# Patient Record
Sex: MALE
[De-identification: names, ages, dates, MRNs, and addresses within clinical notes are randomized per-mention and may not be internally consistent; named-entity substitution may affect disease eponyms.]

---

## 2017-03-14 ENCOUNTER — CHARTING TRANS (OUTPATIENT)
Dept: OTHER | Age: 52
End: 2017-03-14

## 2018-11-05 VITALS
RESPIRATION RATE: 14 BRPM | SYSTOLIC BLOOD PRESSURE: 122 MMHG | HEIGHT: 71 IN | DIASTOLIC BLOOD PRESSURE: 82 MMHG | HEART RATE: 76 BPM | TEMPERATURE: 98.5 F | WEIGHT: 200 LBS | BODY MASS INDEX: 28 KG/M2

## 2020-09-23 ENCOUNTER — OFFICE VISIT (OUTPATIENT)
Dept: NEUROLOGY | Facility: CLINIC | Age: 55
End: 2020-09-23
Payer: COMMERCIAL

## 2020-09-23 ENCOUNTER — HOSPITAL ENCOUNTER (OUTPATIENT)
Dept: GENERAL RADIOLOGY | Age: 55
Discharge: HOME | End: 2020-09-23
Attending: PHYSICAL MEDICINE & REHABILITATION
Payer: COMMERCIAL

## 2020-09-23 VITALS
HEIGHT: 71 IN | BODY MASS INDEX: 27.3 KG/M2 | DIASTOLIC BLOOD PRESSURE: 70 MMHG | SYSTOLIC BLOOD PRESSURE: 120 MMHG | WEIGHT: 195 LBS

## 2020-09-23 DIAGNOSIS — M43.10 RETROLISTHESIS: ICD-10-CM

## 2020-09-23 DIAGNOSIS — M43.10 PARS DEFECT WITH SPONDYLOLISTHESIS: ICD-10-CM

## 2020-09-23 DIAGNOSIS — M43.00 PARS DEFECT WITH SPONDYLOLISTHESIS: ICD-10-CM

## 2020-09-23 DIAGNOSIS — M43.16 SPONDYLOLISTHESIS OF LUMBAR REGION: ICD-10-CM

## 2020-09-23 DIAGNOSIS — M54.16 LUMBAR RADICULOPATHY: ICD-10-CM

## 2020-09-23 DIAGNOSIS — M54.16 LUMBAR RADICULOPATHY: Primary | ICD-10-CM

## 2020-09-23 PROCEDURE — 72110 X-RAY EXAM L-2 SPINE 4/>VWS: CPT | Performed by: PHYSICAL MEDICINE & REHABILITATION

## 2020-09-23 PROCEDURE — 3074F SYST BP LT 130 MM HG: CPT | Performed by: PHYSICAL MEDICINE & REHABILITATION

## 2020-09-23 PROCEDURE — 3008F BODY MASS INDEX DOCD: CPT | Performed by: PHYSICAL MEDICINE & REHABILITATION

## 2020-09-23 PROCEDURE — 99205 OFFICE O/P NEW HI 60 MIN: CPT | Performed by: PHYSICAL MEDICINE & REHABILITATION

## 2020-09-23 PROCEDURE — 3078F DIAST BP <80 MM HG: CPT | Performed by: PHYSICAL MEDICINE & REHABILITATION

## 2020-09-23 NOTE — PROGRESS NOTES
Low Back Pain H & P    Chief Complaint:  Patient presents with:  Establish Care: Pt presents with low back pain which started 3 weeks ago and not due to an injury. Pain radiates to right buttocks and right thigh. Pain is constant.  Bending over increases p sharp pain. · The pain is worse both sitting and standing, going from sitting to standing and becoming erect after bending over. · The pain is better laying on the floor with his legs up and doing posterior pelvic tilts with his stretches.   · There is Relationship status: Not on file      Intimate partner violence        Fear of current or ex partner: Not on file        Emotionally abused: Not on file        Physically abused: Not on file        Forced sexual activity: Not on file    Other Topics      C distress. Patient does not have a cough. HEENT:  Extraocular muscles are intact. There is no kern icterus. Pupils are equal, round, and reactive to light. No redness or discharge bilaterally. Skin:  There are no rashes or lesions.     Vitals:   09/23/ Lumbar Spine:  Sitting straight leg raise-RIGHT Negative for pain   Sitting straight leg raise-LEFT Negative for pain   Supine straight leg raise-RIGHT Negative for pain   Supine straight leg raise-LEFT Negative for pain   Slump test-RIGHT Negative for leeroy

## 2020-09-23 NOTE — PATIENT INSTRUCTIONS
Plan  He will get into the PT for the lumbar spine. The patient does not need any injections at this time. The patient does not need any pain medications at this time. He will follow up in 2-3 months or sooner if needed.

## 2020-10-07 ENCOUNTER — TELEPHONE (OUTPATIENT)
Dept: NEUROLOGY | Facility: CLINIC | Age: 55
End: 2020-10-07

## 2020-10-07 NOTE — TELEPHONE ENCOUNTER
Received voicemail from patient inquiring about plan of care. Received fax from DPT and placed in Dr. Constantin Michaels.     Left message on patient's voicemail regarding the above

## 2020-10-09 NOTE — TELEPHONE ENCOUNTER
Patient notified of Dr. Irasema Peck response and was unclear why we were calling as he did not call office. Patient is already in Physical Therapy, aware of the plan and has a follow up 11/16/20  Apologies offered to patient for the confusion.

## 2020-10-09 NOTE — TELEPHONE ENCOUNTER
He is to start the PT as discussed at his office visit and I have signed the PT note and it will be faxed back.

## 2020-10-12 ENCOUNTER — MED REC SCAN ONLY (OUTPATIENT)
Dept: NEUROLOGY | Facility: CLINIC | Age: 55
End: 2020-10-12

## 2020-11-16 ENCOUNTER — OFFICE VISIT (OUTPATIENT)
Dept: NEUROLOGY | Facility: CLINIC | Age: 55
End: 2020-11-16
Payer: COMMERCIAL

## 2020-11-16 VITALS — HEIGHT: 71 IN | BODY MASS INDEX: 27.3 KG/M2 | WEIGHT: 195 LBS

## 2020-11-16 DIAGNOSIS — M43.16 SPONDYLOLISTHESIS OF LUMBAR REGION: Primary | ICD-10-CM

## 2020-11-16 DIAGNOSIS — M54.16 LUMBAR RADICULOPATHY: ICD-10-CM

## 2020-11-16 DIAGNOSIS — M43.10 RETROLISTHESIS: ICD-10-CM

## 2020-11-16 DIAGNOSIS — M43.10 PARS DEFECT WITH SPONDYLOLISTHESIS: ICD-10-CM

## 2020-11-16 DIAGNOSIS — M43.00 PARS DEFECT WITH SPONDYLOLISTHESIS: ICD-10-CM

## 2020-11-16 PROCEDURE — 3008F BODY MASS INDEX DOCD: CPT | Performed by: PHYSICAL MEDICINE & REHABILITATION

## 2020-11-16 PROCEDURE — 99214 OFFICE O/P EST MOD 30 MIN: CPT | Performed by: PHYSICAL MEDICINE & REHABILITATION

## 2020-11-16 NOTE — PROGRESS NOTES
Low Back Pain H & P    Chief Complaint: Patient presents with:  Low Back Pain: Patient present for f/u on low back that is decreased to soreness in the am. picking up, bad posture increase the pain through out the day.  Rate pain 2/10    Nursing note review resource strain: Not on file      Food insecurity        Worry: Not on file        Inability: Not on file      Transportation needs        Medical: Not on file        Non-medical: Not on file    Tobacco Use      Smoking status: Never Smoker      Smokeless no distress. The patient is well groomed. Psychiatric:  The patient is alert and oriented x 3. The patient has a normal affect and mood. Respiratory:  No acute respiratory distress. Patient does not have a cough.     HEENT:  Extraocular muscles ar any injections at this time. The patient does not need any pain medications at this time. He will follow up as needed. He will sere how he does when he tries to resume golfing in the Spring.     The patient understands and agrees with the stated pl

## 2020-11-16 NOTE — PATIENT INSTRUCTIONS
Plan  The patient will continue with his home exercise program and working out with his . We discussed the need to continue with the core stabilization since this is the best treatment for him to stabilize his spine.     He does not need to have

## 2020-12-02 ENCOUNTER — MED REC SCAN ONLY (OUTPATIENT)
Dept: NEUROLOGY | Facility: CLINIC | Age: 55
End: 2020-12-02

## 2020-12-10 ENCOUNTER — ORDER TRANSCRIPTION (OUTPATIENT)
Dept: ADMINISTRATIVE | Facility: HOSPITAL | Age: 55
End: 2020-12-10

## 2020-12-10 DIAGNOSIS — R94.39 ABNORMAL CARDIOVASCULAR STRESS TEST: ICD-10-CM

## 2020-12-10 DIAGNOSIS — R07.9 CHEST PAIN IN ADULT: Primary | ICD-10-CM

## 2020-12-14 RX ORDER — METOPROLOL TARTRATE 5 MG/5ML
5 INJECTION INTRAVENOUS SEE ADMIN INSTRUCTIONS
Status: DISCONTINUED | OUTPATIENT
Start: 2020-12-17 | End: 2020-12-20

## 2020-12-14 RX ORDER — DILTIAZEM HYDROCHLORIDE 5 MG/ML
5 INJECTION INTRAVENOUS SEE ADMIN INSTRUCTIONS
Status: DISCONTINUED | OUTPATIENT
Start: 2020-12-17 | End: 2020-12-20

## 2020-12-14 RX ORDER — NITROGLYCERIN 0.4 MG/1
0.4 TABLET SUBLINGUAL ONCE
Status: DISCONTINUED | OUTPATIENT
Start: 2020-12-17 | End: 2020-12-20

## 2020-12-17 ENCOUNTER — APPOINTMENT (OUTPATIENT)
Dept: CT IMAGING | Facility: HOSPITAL | Age: 55
End: 2020-12-17
Attending: INTERNAL MEDICINE
Payer: COMMERCIAL

## 2020-12-17 ENCOUNTER — HOSPITAL ENCOUNTER (OUTPATIENT)
Dept: CT IMAGING | Facility: HOSPITAL | Age: 55
Discharge: HOME | End: 2020-12-17
Attending: INTERNAL MEDICINE
Payer: COMMERCIAL

## 2020-12-28 RX ORDER — NITROGLYCERIN 0.4 MG/1
0.4 TABLET SUBLINGUAL ONCE
Status: COMPLETED | OUTPATIENT
Start: 2020-12-29 | End: 2020-12-29

## 2020-12-28 RX ORDER — METOPROLOL TARTRATE 5 MG/5ML
5 INJECTION INTRAVENOUS SEE ADMIN INSTRUCTIONS
Status: DISCONTINUED | OUTPATIENT
Start: 2020-12-29 | End: 2020-12-31

## 2020-12-28 RX ORDER — DILTIAZEM HYDROCHLORIDE 5 MG/ML
5 INJECTION INTRAVENOUS SEE ADMIN INSTRUCTIONS
Status: DISCONTINUED | OUTPATIENT
Start: 2020-12-29 | End: 2020-12-31

## 2020-12-29 ENCOUNTER — HOSPITAL ENCOUNTER (OUTPATIENT)
Dept: CT IMAGING | Facility: HOSPITAL | Age: 55
Discharge: HOME | End: 2020-12-29
Attending: INTERNAL MEDICINE
Payer: COMMERCIAL

## 2020-12-29 VITALS
BODY MASS INDEX: 27.3 KG/M2 | HEART RATE: 55 BPM | DIASTOLIC BLOOD PRESSURE: 71 MMHG | WEIGHT: 195 LBS | RESPIRATION RATE: 15 BRPM | HEIGHT: 71 IN | SYSTOLIC BLOOD PRESSURE: 114 MMHG

## 2020-12-29 DIAGNOSIS — R94.39 ABNORMAL CARDIOVASCULAR STRESS TEST: ICD-10-CM

## 2020-12-29 DIAGNOSIS — R07.9 CHEST PAIN IN ADULT: ICD-10-CM

## 2020-12-29 LAB — CREAT BLD-MCNC: 0.9 MG/DL (ref 0.7–1.3)

## 2020-12-29 PROCEDURE — 75574 CT ANGIO HRT W/3D IMAGE: CPT | Performed by: INTERNAL MEDICINE

## 2020-12-29 PROCEDURE — 82565 ASSAY OF CREATININE: CPT

## 2020-12-29 RX ORDER — NITROGLYCERIN 0.4 MG/1
TABLET SUBLINGUAL
Status: COMPLETED
Start: 2020-12-29 | End: 2020-12-29

## 2020-12-29 RX ADMIN — NITROGLYCERIN 0.4 MG: 0.4 TABLET SUBLINGUAL at 08:31:00

## 2020-12-29 NOTE — IMAGING NOTE
TO RAD HOLDING AT 0800      HX TAKEN : chest pain and abnormal stress test    PT CONSENTED AT 0807     BASELINE VITAL SIGNS   HR 55  /71    CTA ORDERED BY DR. Celia Zimmerman  WAS PT GIVEN CTA  PREMEDS NO, IF YES  25 MG PO METOPROLOL     18 GAUGE IV STARTED

## 2021-03-24 ENCOUNTER — TELEMEDICINE (OUTPATIENT)
Dept: NEUROLOGY | Facility: CLINIC | Age: 56
End: 2021-03-24

## 2021-03-24 VITALS — WEIGHT: 185 LBS | HEIGHT: 71 IN | BODY MASS INDEX: 25.9 KG/M2

## 2021-03-24 DIAGNOSIS — M43.10 RETROLISTHESIS: ICD-10-CM

## 2021-03-24 DIAGNOSIS — M43.00 PARS DEFECT WITH SPONDYLOLISTHESIS: ICD-10-CM

## 2021-03-24 DIAGNOSIS — M54.16 LUMBAR RADICULOPATHY: Primary | ICD-10-CM

## 2021-03-24 DIAGNOSIS — M43.10 PARS DEFECT WITH SPONDYLOLISTHESIS: ICD-10-CM

## 2021-03-24 DIAGNOSIS — M43.16 SPONDYLOLISTHESIS OF LUMBAR REGION: ICD-10-CM

## 2021-03-24 PROCEDURE — 3008F BODY MASS INDEX DOCD: CPT | Performed by: PHYSICAL MEDICINE & REHABILITATION

## 2021-03-24 PROCEDURE — 99213 OFFICE O/P EST LOW 20 MIN: CPT | Performed by: PHYSICAL MEDICINE & REHABILITATION

## 2021-03-24 NOTE — PROGRESS NOTES
[address]  [facility]    Telemedicine Visit - Evaluation    Ignacio Cabrera verbally consents to a Telemedicine Visit on 03/24/21. This visit is conducted using Telemedicine with live, interactive audio and video.     Azzenaida Alberta CAD.    • Hyperlipidemia          PAST SURGICAL HISTORY:     Past Surgical History:   Procedure Laterality Date   • COLONOSCOPY  6/16    normal- repeat 10 yrs   • COLONOSCOPY, POSSIBLE BIOPSY, POSSIBLE POLYPECTOMY 76022 N/A 6/23/2016    Performed by Mervin Gill intact, spontaneous speech intact  Psychiatric: Mood and affect appropriate    Musculoskeletal Exam:  Gait:    Gait: Normal gait   Sit to Stand: no difficulty      Lumbar Spine:    Scoliosis: No scoliosis present     Special Tests Lumbar Spine:  The patien The patient verbalized understanding with this plan and was in agreement. There are no barriers to learning. All questions were answered. Yovani Beck M.D.   Physical Medicine and Rehabilitation   3/24/2021    Telehealth outside of [facility]

## 2021-03-27 ENCOUNTER — PATIENT MESSAGE (OUTPATIENT)
Dept: NEUROLOGY | Facility: CLINIC | Age: 56
End: 2021-03-27

## 2021-03-27 DIAGNOSIS — M54.16 LUMBAR RADICULOPATHY: Primary | ICD-10-CM

## 2021-03-29 ENCOUNTER — TELEPHONE (OUTPATIENT)
Dept: NEUROLOGY | Facility: CLINIC | Age: 56
End: 2021-03-29

## 2021-03-29 NOTE — TELEPHONE ENCOUNTER
MRI SPINE LUMBAR (CPT=72148)- pending approval  [employer], to initiate authorization for above.    Case # [id number]    Clinical notes sent for review

## 2021-03-29 NOTE — TELEPHONE ENCOUNTER
From: Joey Zhang  To: Tatyana Moore MD  Sent: 3/27/2021 8:42 AM CDT  Subject: Non-Urgent Medical Question    Hi Doctor    I don't see the script for the MRI. Just want to make sure I don't miss it.      Rich

## 2021-03-30 NOTE — TELEPHONE ENCOUNTER
MRI SPINE LUMBAR (UPW=94702)- APPROVED  Authorization # [id number] effective dates: 3/29/21 thru 9/25/21     Notified patient via My Chart     [employer] determination letter sent to scanning

## 2021-04-02 ENCOUNTER — HOSPITAL ENCOUNTER (OUTPATIENT)
Dept: MRI IMAGING | Facility: HOSPITAL | Age: 56
Discharge: HOME | End: 2021-04-02
Attending: PHYSICAL MEDICINE & REHABILITATION
Payer: COMMERCIAL

## 2021-04-02 DIAGNOSIS — M54.16 LUMBAR RADICULOPATHY: ICD-10-CM

## 2021-04-02 PROCEDURE — 72148 MRI LUMBAR SPINE W/O DYE: CPT | Performed by: PHYSICAL MEDICINE & REHABILITATION

## 2021-04-06 ENCOUNTER — TELEMEDICINE (OUTPATIENT)
Dept: NEUROLOGY | Facility: CLINIC | Age: 56
End: 2021-04-06

## 2021-04-06 DIAGNOSIS — M51.26 LUMBAR HERNIATED DISC: ICD-10-CM

## 2021-04-06 DIAGNOSIS — M48.061 SPINAL STENOSIS OF LUMBAR REGION WITHOUT NEUROGENIC CLAUDICATION: ICD-10-CM

## 2021-04-06 DIAGNOSIS — M51.9 LUMBAR DISC DISEASE: ICD-10-CM

## 2021-04-06 DIAGNOSIS — M48.061 LUMBAR FORAMINAL STENOSIS: ICD-10-CM

## 2021-04-06 DIAGNOSIS — M43.00 PARS DEFECT WITH SPONDYLOLISTHESIS: ICD-10-CM

## 2021-04-06 DIAGNOSIS — M43.10 RETROLISTHESIS: ICD-10-CM

## 2021-04-06 DIAGNOSIS — M43.16 SPONDYLOLISTHESIS OF LUMBAR REGION: ICD-10-CM

## 2021-04-06 DIAGNOSIS — M54.16 LUMBAR RADICULOPATHY: Primary | ICD-10-CM

## 2021-04-06 DIAGNOSIS — M43.10 PARS DEFECT WITH SPONDYLOLISTHESIS: ICD-10-CM

## 2021-04-06 PROCEDURE — 99214 OFFICE O/P EST MOD 30 MIN: CPT | Performed by: PHYSICAL MEDICINE & REHABILITATION

## 2021-04-06 NOTE — PROGRESS NOTES
[address]  [facility] [address]    Telemedicine Visit - Evaluation    Tracy Chapman verbally consents to a Telemedicine Visit on 04/06/21. This visit is conducted using Telemedicine with live, interactive audio and video.     Margaret Simmons to bid to affected area on face followed by moisturizer (Patient not taking: Reported on 1/8/2021 ) 45 g 11         ALLERGIES:   No Known Allergies      FAMILY HISTORY:     Family History   Problem Relation Age of Onset   • Hypertension Father    • Cancer the lumbar spine done on 4/2/2021 and my impression is as stated below in the Assessment. ASSESSMENT:     1. right L5-S1 radiculopathy    2. L5-S1 grade 1-2 stable spondylolisthesis, L3-4 grade 1 stable spondylolisthesis    3.  L4-5 grade 1 unstable, L2- conscious effort was taken to allow for sufficient and adequate time to complete the visit. The patient was made aware of the limitations of the telehealth visit, including treatment limitations as no physical exam could be performed.   The patient was adv

## 2021-04-07 ENCOUNTER — TELEPHONE (OUTPATIENT)
Dept: NEUROLOGY | Facility: CLINIC | Age: 56
End: 2021-04-07

## 2021-04-07 NOTE — TELEPHONE ENCOUNTER
[employer] for authorization of approval for Right LTFESI cpt codes F0143089, C440921. Case # [id number].  Faxed notes pending approval

## 2021-04-12 NOTE — TELEPHONE ENCOUNTER
[employer] to check on status of authorization for Right L5 TFESI. It was denied  Based on [address] (CPB) Number 0662: Transforaminal Epidural Injections, we are unable to approve the requested procedure.  Transforaminal epidural i

## 2021-04-13 ENCOUNTER — TELEPHONE (OUTPATIENT)
Dept: NEUROLOGY | Facility: CLINIC | Age: 56
End: 2021-04-13

## 2021-04-13 NOTE — TELEPHONE ENCOUNTER
S/W patient and he would like for Dr. Franklin Jose for do a peer to peer. Office appt set up for 05/25.

## 2021-04-13 NOTE — TELEPHONE ENCOUNTER
[facility] to Peer on 04/14/21 at 2 pm with Dr. Melvin Langley with Case # [id number].  Will inform Dr. Linsey Deluna

## 2021-04-13 NOTE — TELEPHONE ENCOUNTER
I will do a peer to peer but if it fails, he will need to be seen in the office to document the weakness that would support the radiculopathy.

## 2021-04-14 ENCOUNTER — PATIENT MESSAGE (OUTPATIENT)
Dept: NEUROLOGY | Facility: CLINIC | Age: 56
End: 2021-04-14

## 2021-04-14 NOTE — TELEPHONE ENCOUNTER
From: Joey Zhang  To: Tatyana Moore MD  Sent: 4/14/2021 7:01 AM CDT  Subject: Visit Follow-up Question    Hi Doctor    I spoke with Moustapha Queen and she said you would be attempting a peer to peer with [employer] to get injection approved.  I just wanted to menti

## 2021-04-19 ENCOUNTER — OFFICE VISIT (OUTPATIENT)
Dept: NEUROLOGY | Facility: CLINIC | Age: 56
End: 2021-04-19
Payer: COMMERCIAL

## 2021-04-19 ENCOUNTER — TELEPHONE (OUTPATIENT)
Dept: NEUROLOGY | Facility: CLINIC | Age: 56
End: 2021-04-19

## 2021-04-19 VITALS — HEIGHT: 71 IN | WEIGHT: 185 LBS | BODY MASS INDEX: 25.9 KG/M2

## 2021-04-19 DIAGNOSIS — M48.061 LUMBAR FORAMINAL STENOSIS: ICD-10-CM

## 2021-04-19 DIAGNOSIS — M43.00 PARS DEFECT WITH SPONDYLOLISTHESIS: ICD-10-CM

## 2021-04-19 DIAGNOSIS — M43.10 PARS DEFECT WITH SPONDYLOLISTHESIS: ICD-10-CM

## 2021-04-19 DIAGNOSIS — M43.10 RETROLISTHESIS: ICD-10-CM

## 2021-04-19 DIAGNOSIS — M43.16 SPONDYLOLISTHESIS OF LUMBAR REGION: ICD-10-CM

## 2021-04-19 DIAGNOSIS — M48.061 SPINAL STENOSIS OF LUMBAR REGION WITHOUT NEUROGENIC CLAUDICATION: ICD-10-CM

## 2021-04-19 DIAGNOSIS — M51.9 LUMBAR DISC DISEASE: ICD-10-CM

## 2021-04-19 DIAGNOSIS — M51.26 LUMBAR HERNIATED DISC: ICD-10-CM

## 2021-04-19 DIAGNOSIS — M54.16 LUMBAR RADICULOPATHY: Primary | ICD-10-CM

## 2021-04-19 PROCEDURE — 3008F BODY MASS INDEX DOCD: CPT | Performed by: PHYSICAL MEDICINE & REHABILITATION

## 2021-04-19 PROCEDURE — 99214 OFFICE O/P EST MOD 30 MIN: CPT | Performed by: PHYSICAL MEDICINE & REHABILITATION

## 2021-04-19 NOTE — PROGRESS NOTES
Low Back Pain H & P    Chief Complaint: Patient presents with:  Low Back Pain: LOV: 11/16/2020. MRI 04/02/2021. Patient lower back pain.   Patient finiished PT and doing home exercises and going to the gym Patient states he is having lower left sided pain d Location: [facility]   • OTHER SURGICAL HISTORY      right knee scope   • OTHER SURGICAL HISTORY      right shoulder scope        Family History   Family History   Problem Relation Age of Onset   • Hypertension Father    • Cancer Father Gatherings with Friends and Family:       Attends [employer]oration[employer] Services:       Active Member of Clubs or Organizations:       Attends Club or Organization Meetings:       Marital Status:   Intimate Partner Violence:       Fear of Current or Ex-Partner:       Intact in bilateral Lower Extremities   LE Muscle Strength:  All LE strength measurements 5/5 except:  Hamstring RIGHT:   4+/5  Hamstring LEFT:   4+/5   RIGHT plantar reflexes: downward response   LEFT plantar reflexes: downward response   Reflexes: 2+ in b

## 2021-04-19 NOTE — PATIENT INSTRUCTIONS
Plan  I will perform bilateral L5 TFESI(s). He will do 2-6 sessions of the PT after he has had the above. The patient does not need any pain medications at this time.     The patient will follow up in 2 months, but the patient will call me 2 weeks a

## 2021-04-19 NOTE — TELEPHONE ENCOUNTER
[employer] for authorization of approval for Bilateral L5 TFESI cpt code [id number], [id number]. Case # [id number].  Faxed clinical notes pending approval.

## 2021-04-21 ENCOUNTER — PATIENT MESSAGE (OUTPATIENT)
Dept: NEUROLOGY | Facility: CLINIC | Age: 56
End: 2021-04-21

## 2021-04-21 NOTE — TELEPHONE ENCOUNTER
[facility] on [facility] (CPB[facility] Number 0201: Transforaminal Epidural Injections, we are unable to approve the requested procedure.  Transforaminal epidural injections are considered medically necessary for the treatment of confirmed radiculopa

## 2021-04-21 NOTE — TELEPHONE ENCOUNTER
From: Fela Green  To: Sachi Stevenson MD  Sent: 4/21/2021 8:06 AM CDT  Subject: Visit Follow-up Question    Hi     I just got a recorded message [employer] refused a doctor ordered procedure it doesn't say the injections but I assume that's it.      Can we do

## 2021-04-21 NOTE — TELEPHONE ENCOUNTER
From: Caden Soto  To: Jennifer Rios MD  Sent: 4/21/2021 11:23 AM CDT  Subject: Visit Follow-up Question    Cristi Leiva with [facility] they said they just have a few questions because they said the request was a bit unclear.  They said they have reac

## 2021-04-23 ENCOUNTER — PATIENT MESSAGE (OUTPATIENT)
Dept: NEUROLOGY | Facility: CLINIC | Age: 56
End: 2021-04-23

## 2021-04-23 NOTE — TELEPHONE ENCOUNTER
Per Dr. Lobo Parekh, requesting peer to peer time move before 1:30    [address], spoke to [address] [place] [address] who states no appts available before 1:30. Verbally informed Dr. Lobo Parekh.

## 2021-04-23 NOTE — TELEPHONE ENCOUNTER
From: Caden Soto  To: Jennifer Rios MD  Sent: 4/23/2021 10:44 AM CDT  Subject: Visit Follow-up Question    Hi [facility]    Has there been any progress with [facility] they were waiting to hear back from your office on some questions.      Please let me know

## 2021-04-26 NOTE — TELEPHONE ENCOUNTER
After Peer to Peer Bilateral L5 TFESI was approved. Authorization # [id number] for one unit/DOS effective 04/19/21 to 10/20/21. Will inform Nursing.

## 2021-04-26 NOTE — TELEPHONE ENCOUNTER
Patient has been scheduled for Bilateral L5 TFESI on 5/7/21 at the [facility] with Marisol Diaz.    -Anesthesia type: Local.  -If receiving MAC or IVC sedation patient will need to get COVID tested 3 days prior (order placed by [facility].) N/A local  -Medications and emelia

## 2021-05-05 NOTE — TELEPHONE ENCOUNTER
Received an incoming call from [facility], Neeraj He who states patient is scheduled on 5/07/21 with Dr. Naren Stevenson for Bilateral L5 TFESI cpt code 67586-02, 11513 however scheduling form has different CPT CODEs.      Requesting a scheduling for revision

## 2021-05-07 ENCOUNTER — OFFICE VISIT (OUTPATIENT)
Dept: SURGERY | Facility: CLINIC | Age: 56
End: 2021-05-07

## 2021-05-07 DIAGNOSIS — M54.16 LUMBAR RADICULOPATHY: Primary | ICD-10-CM

## 2021-05-07 DIAGNOSIS — M48.061 LUMBAR FORAMINAL STENOSIS: ICD-10-CM

## 2021-05-07 DIAGNOSIS — M51.9 LUMBAR DISC DISEASE: ICD-10-CM

## 2021-05-07 PROCEDURE — 64483 NJX AA&/STRD TFRM EPI L/S 1: CPT | Performed by: PHYSICAL MEDICINE & REHABILITATION

## 2021-05-07 NOTE — PROCEDURES
[address].    BILATERAL LUMBAR TRANSFORAMINAL   NAME:  Dany Porter    MR #:    [id number] :  1965     PHYSICIAN:  Remedios Landry        Operative Report    DATE OF PROCEDURE: 2021   PREOPERATIVE DIAGNOSES: 1. bilateral correct needle placement. Then, aspiration was performed. No blood, fluid, or air was aspirated. Then, the patient was injected with a 3 cc solution of 1.5 cc of 40 mg/cc of Kenalog and 1.5 cc of 1% PF lidocaine without epinephrine.   After this, the nee

## 2021-11-17 PROBLEM — Q04.8 CEREBELLAR TONSILLAR ECTOPIA (HCC): Status: ACTIVE | Noted: 2021-11-17

## 2021-11-17 PROBLEM — R42 EPISODES OF VERTIGO OCCURRING INFREQUENTLY: Status: ACTIVE | Noted: 2021-11-17

## (undated) NOTE — LETTER
[facility]    Consent for Coronary CT Angiography    Your doctor has recommended that you have a Coronary CT Angiography procedure.  Coronary CT Angiography is a diagnostic procedure using computed tomography to scan the The medication and the contrast material used as part of your procedure are all deemed very safe, however there is always an element of risk to a patient when taking medication.  Allergic reactions to medication can range from very minor to very serious galilea